# Patient Record
Sex: FEMALE | URBAN - METROPOLITAN AREA
[De-identification: names, ages, dates, MRNs, and addresses within clinical notes are randomized per-mention and may not be internally consistent; named-entity substitution may affect disease eponyms.]

---

## 2023-10-11 ENCOUNTER — APPOINTMENT (OUTPATIENT)
Dept: URBAN - METROPOLITAN AREA CLINIC 250 | Age: 83
Setting detail: DERMATOLOGY
End: 2023-10-11

## 2023-10-11 DIAGNOSIS — L82.1 OTHER SEBORRHEIC KERATOSIS: ICD-10-CM

## 2023-10-11 PROCEDURE — OTHER COUNSELING: OTHER

## 2023-10-11 PROCEDURE — 99202 OFFICE O/P NEW SF 15 MIN: CPT

## 2023-10-11 PROCEDURE — OTHER OTC TREATMENT REGIMEN: OTHER

## 2023-10-11 PROCEDURE — OTHER MIPS QUALITY: OTHER

## 2023-10-11 ASSESSMENT — LOCATION SIMPLE DESCRIPTION DERM: LOCATION SIMPLE: LEFT CHEEK

## 2023-10-11 ASSESSMENT — LOCATION DETAILED DESCRIPTION DERM: LOCATION DETAILED: LEFT SUPERIOR LATERAL BUCCAL CHEEK

## 2023-10-11 ASSESSMENT — LOCATION ZONE DERM: LOCATION ZONE: FACE

## 2023-10-11 NOTE — HPI: DRY SKIN
How Severe Is Your Dry Skin?: moderate
Is This A New Presentation Or A Follow-Up?: Dry Skin
Additional History: OTC Aveeno lotion \\n